# Patient Record
Sex: FEMALE | Race: BLACK OR AFRICAN AMERICAN | Employment: UNEMPLOYED | URBAN - METROPOLITAN AREA
[De-identification: names, ages, dates, MRNs, and addresses within clinical notes are randomized per-mention and may not be internally consistent; named-entity substitution may affect disease eponyms.]

---

## 2020-10-28 ENCOUNTER — VIRTUAL VISIT (OUTPATIENT)
Dept: SURGERY | Age: 35
End: 2020-10-28
Payer: MEDICAID

## 2020-10-28 VITALS — WEIGHT: 293 LBS | HEIGHT: 67 IN | BODY MASS INDEX: 45.99 KG/M2

## 2020-10-28 DIAGNOSIS — E66.01 MORBID OBESITY (HCC): ICD-10-CM

## 2020-10-28 DIAGNOSIS — M54.50 LOW BACK PAIN, UNSPECIFIED BACK PAIN LATERALITY, UNSPECIFIED CHRONICITY, UNSPECIFIED WHETHER SCIATICA PRESENT: ICD-10-CM

## 2020-10-28 DIAGNOSIS — N83.209 CYST OF OVARY, UNSPECIFIED LATERALITY: ICD-10-CM

## 2020-10-28 DIAGNOSIS — N85.8 UTERINE MASS: ICD-10-CM

## 2020-10-28 DIAGNOSIS — F17.200 SMOKER: ICD-10-CM

## 2020-10-28 DIAGNOSIS — G47.30 SLEEP DISORDER BREATHING: ICD-10-CM

## 2020-10-28 DIAGNOSIS — E66.01 MORBID OBESITY WITH BMI OF 60.0-69.9, ADULT (HCC): ICD-10-CM

## 2020-10-28 DIAGNOSIS — K80.20 CALCULUS OF GALLBLADDER WITHOUT CHOLECYSTITIS WITHOUT OBSTRUCTION: ICD-10-CM

## 2020-10-28 DIAGNOSIS — K80.20 CALCULUS OF GALLBLADDER WITHOUT CHOLECYSTITIS WITHOUT OBSTRUCTION: Primary | ICD-10-CM

## 2020-10-28 PROCEDURE — 99205 OFFICE O/P NEW HI 60 MIN: CPT | Performed by: SPECIALIST

## 2020-10-28 RX ORDER — ASPIRIN 325 MG
TABLET, DELAYED RELEASE (ENTERIC COATED) ORAL
COMMUNITY

## 2020-10-28 RX ORDER — AMLODIPINE BESYLATE 5 MG/1
10 TABLET ORAL DAILY
COMMUNITY

## 2020-10-28 NOTE — PROGRESS NOTES
Bariatric Surgery Consultation    Subjective: The patient is a 29 y.o. obese female with a Body mass index is 67.35 kg/m². .  The patient is at her heaviest weight for the past 14 years. she has been overweight since age 10.   she has been considering surgery since last year. she desires surgery at this time because of multiple health concerns and their lifestyle issues which are hindered by their weight. she has been referred by his family physician Dr Devin Boyd for evaluation and treatment of their obesity via surgical intervention. Ulysses Viramontes has tried multiple diets in her lifetime most recently tried physician supervised, behavior modification, unsupervised diets and Atkins    Bariatric comorbidities present are   Patient Active Problem List   Diagnosis Code    Morbid obesity with body mass index (BMI) greater than or equal to 79 in adult ([facility] [address] E66.01, Z68.45    Smoker F17.200    Cholelithiasis K80.20    Ovarian cyst N83.209    Uterine mass N85.8    Back pain M54.9    Sleep disorder breathing G47.30    Morbid obesity ([facility] [address] E66.01    Morbid obesity with BMI of 60.0-69.9, adult ([facility] [address] E66.01, Z68.44       The patient is considering laparoscopic gastric bypass surgery and laparoscopic sleeve gastrectomy for surgical weight loss due to their ineffective progress with medical forms of weight loss and the urging of their physician who cares for their primary medical issues. The patient  now presents  for consideration for weight loss surgery understanding the benefits of this over a medical approach of weight loss as was discussed in our presentation on weight loss surgery. They have discussed their plans both with their family and primary care physician who is in support of their pursuit of such. The patient has not had health issues as of late and denies and gastrointestinal disturbances other than what is outlined below in their review of symptoms.  All of their prior evaluations available by both their PCP's and specialists physicians have been reviewed today either in the Care Everywhere portal or scanned under the media tab. I have spent a large portion of my initial consultation today reviewing the patients current dietary habits which have contributed to their health issues and obesity. I have suggested to them personally a dietary regimen that they can initiate now to help with their status as it pertains to their weight. They understand that the most important aspect of their journey through their weight loss endeavor will be their adherence to a new lifestyle of healthy eating behavior. They also understand that an adherence to an exercise program will not only help with weight loss but is ultimately important in weight maintenance. The patients goal weight is 180lb. These goals are consistent with expected outcomes of their desired operation with a 2 stage approach potentially. her Medical goals are resolution of these health issues. 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       cholecalciferol (VITAMIN D3) (50,000 UNITS /1250 MCG) capsule Take by mouth every seven (7) days. No Known Allergies       Review of Systems:       General - No history or complaints of unexpected fever, chills, or weight loss  Head/Neck - No history or complaints of headache, diplopia, dysphagia, hearing loss  Cardiac - No history or complaints of chest pain, palpitations, murmur, or shortness of breath  Pulmonary - No history or complaints of shortness of breath, productive cough, hemoptysis  Gastrointestinal - minimal reflux,no  abdominal pain, obstipation/constipation or blood per rectum  Genitourinary - No history or complaints of hematuria/dysuria, stress urinary incontinence symptoms, or renal lithiasis  Musculoskeletal - mild joint pain ,  no muscular weakness  Hematologic - No history or complaints of bleeding disorders,  No blood transfusions  Neurologic - No history or complaints of  migraine headaches, seizure activity, syncopal episodes, TIA or stroke  Integumentary - No history or complaints of rashes, abnormal nevi, skin cancer  Gynecological - heavy menses               Objective:     Visit [facility]   5' 7\" (1.702 m)   Wt (!) 195 kg (430 lb)   BMI 67.35 kg/m²       Physical Examination:       Physical Examination: General appearance - alert, well appearing, and in no distress and oriented to person, place, and time  Mental status - alert, oriented to person, place, and time, normal mood, behavior, speech, dress, motor activity, and thought processes  Eyes - pupils equal and reactive, extraocular eye movements intact, sclera anicteric, left eye normal, right eye normal  Ears - right ear normal, left ear normal  Neck- good extension and flexion, no obvious swelling  Chest - good air movement  Heart - N/A  Abdomen - no obvious distension, scars as noted.   Neurological - alert, oriented, normal speech, no focal findings or movement disorder noted  Musculoskeletal - no swelling noted  Extremities - normal movement    Labs:       No results found for this or any previous visit (from the past 1440 hour(s)). Assessment:     Morbid obesity with comorbidity    Plan:     laparoscopic gastric bypass surgery and laparoscopic sleeve gastrectomy    This is a 29 y.o. female with a BMI of Body mass index is 67.35 kg/m². and the weight-related co-morbidties as noted below. Morenita meets the NIH criteria for bariatric surgery based upon the BMI of Body mass index is 67.35 kg/m². and multiple weight-related co-morbidties. Galo Luna has elected laparoscopic sleeve gastrectomy as her intervention of choice for treatment of morbid obestiy through surgical means secondary to its safety profile, rapid return to work  and decreases in operative risks over gastric bypass. In the office today, following Morenita's history and physical examination, a 30 minute discussion regarding the anatomic alterations for the laparoscopic sleeve gastrectomy was undertaken. The dietary expectations and the patient and physician dependent factors for success were thoroughly discussed, to include the need for interval follow-up and long-term dietary changes associated with success. The possible complications of the sleeve gastrectomy  were also discussed, to include;death, DVT/PE, staple line leak, bleeding, stricture formation, infection, nutritional deficiencies and sleeve dilation. Specific weight related outcomes for success were also discussed with an emphasis on careful and close follow-up with the first year and eating behavior modification as the baseline and cyclical hunger return. The patient expressed an understanding of the above factors, and her questions were answered in their entirety. In addition, the patient watched a seminar regarding obesity, surgical weight loss including, adjustable gastric band, gastric bypass, and sleeve gastrectomy.   This discussion contrasted the different surgical techniques, mechanisms of actions and expected outcomes, and surgical and medical risks associated with each procedure. In office today we had a long question and answer session where each questions was answered until there were no additional questions. Today, the patient had all of her questions answered and desires to proceed with  bariatric surgery initially choosing sleeve gastrectomy as her surgical option. Secondary Diagnoses:     Dietary Intervention  - The patient is currently scheduled to see or has been followed by a bariatric nutritionist for an attempt at preoperative weight loss as has been dictated by their insurance carrier. They will be assessed at various times during their follow up to evaluate their progress depending on the length of time that is required once again by their carrier. I have explained the importance of   preoperative weight loss and the benefits regarding lower surgical risk and also assisting the patient in reaching their weight loss goal. They understand also that they should participate in an  exercise program to assist in this weight loss. Finally they understand there is a physiologic benefit from the standpoint of hepatic volume reduction and reduction of central visceral adiposity   preoperatively. I have reiterated the importance of a low carbohydrate and high protein regimen to achieve their   stated goal. I have reviewed their current eating behavior prior to this encounter and explained to them in an exhaustive fashion the appropriate diet that they should adhere to. They have been   encouraged to loose weight pre operatively and understand it is our prerogative to cancel surgery or postpone their procedure in the event of significant weight gain. The patients weight loss goal pre operatively is 20-50 pounds. Smoking Cessation - Today I have counseled the patient extensively regarding smoking cessation for greater than 10 minutes.   They have been counseled extensively about the detrimental effects of smoking on their weight loss surgical procedure particularly for the gastric bypass and sleeve gastrectomy procedures. They understand that smoking leads to pulmonary issues postoperatively and can lead to gastric ulcers and marginal ulcers in the post bariatric surgery pouch that has been created. They understand that they must stop smoking 1 month at least prior to surgery or it may affect their ultimate progression to their procedure. They understand finally that labs may be obtained to prove that they have ceased smoking prior to surgery. Total time counseling was greater than 10 minutes. Obstructive Sleep Apnea -The patient understands the association of sleep apnea and obesity and the additional risk that it caries related to post surgical complications. If they have not been tested for sleep apnea and I feel they are at increased risk for this diagnosis, then they will be scheduled for a consultation with a Pulmonologist for such. In the event that they rhoda this diagnosis we will have the patient bring their CPAP machine to the hospital for use both postoperatively in the PACU and on the floor at its appropriate setting.  We will have them continue using it while at home after surgery and follow up with their pulmonologist 6 months after to be retested to see if it can be discontinued at that time period. This visit with Shannon Chavez was performed under virtual telemedicine guidelines during the coronavirus (CBUGS-02) public health emergency on 10/28/2020 in an interactive   fashion using [employer]. They understand that this telemedicine encounter is a billable service, with coverage determined by their insurance carrier. They are aware that   they may receive a bill and have provided verbal consent for this virtual visit. This visit was performed with the patient in their home environment and provider was   present at [facility].  I have spent over 60 minutes on this visit  both prior to the visits reviewing the patients chart and with the patient face to face. I have reviewed their medical history, performed a telemedicine physical examination, and discussed the plan of action to date. They understand that they will be asked   to come to the office when our office is allowed normal patient interaction, as dictated by public health officials, for a face-to-face visit to rediscuss all of the things we  have talked about today. During this visit we discussed the varieties of surgeries that we perform, how they would impact the patient from a weight loss standpoint   considering their medical issues and prior surgeries, and also the restrictions that the patient would have long-term with the operation that they have chosen.     Boo Nath M.D.  10/28/2020        Signed By: Cherie Miller MD     October 28, 2020

## 2020-12-27 DIAGNOSIS — G47.30 SLEEP DISORDER BREATHING: ICD-10-CM

## 2020-12-27 DIAGNOSIS — E66.01 MORBID OBESITY (HCC): ICD-10-CM

## 2020-12-27 DIAGNOSIS — N85.8 UTERINE MASS: ICD-10-CM

## 2020-12-27 DIAGNOSIS — M54.50 LOW BACK PAIN, UNSPECIFIED BACK PAIN LATERALITY, UNSPECIFIED CHRONICITY, UNSPECIFIED WHETHER SCIATICA PRESENT: ICD-10-CM

## 2020-12-27 DIAGNOSIS — K80.20 CALCULUS OF GALLBLADDER WITHOUT CHOLECYSTITIS WITHOUT OBSTRUCTION: ICD-10-CM

## 2020-12-27 DIAGNOSIS — N83.209 CYST OF OVARY, UNSPECIFIED LATERALITY: ICD-10-CM

## 2020-12-27 DIAGNOSIS — F17.200 SMOKER: ICD-10-CM

## 2020-12-27 DIAGNOSIS — E66.01 MORBID OBESITY WITH BMI OF 60.0-69.9, ADULT (HCC): ICD-10-CM

## 2021-01-27 ENCOUNTER — APPOINTMENT (OUTPATIENT)
Dept: GENERAL RADIOLOGY | Age: 36
End: 2021-01-27
Attending: SPECIALIST
Payer: MEDICAID

## 2021-01-27 ENCOUNTER — HOSPITAL ENCOUNTER (OUTPATIENT)
Age: 36
Setting detail: OUTPATIENT SURGERY
Discharge: HOME | End: 2021-01-27
Attending: SPECIALIST | Admitting: SPECIALIST
Payer: MEDICAID

## 2021-01-27 VITALS
BODY MASS INDEX: 67.43 KG/M2 | DIASTOLIC BLOOD PRESSURE: 84 MMHG | OXYGEN SATURATION: 100 % | HEART RATE: 96 BPM | SYSTOLIC BLOOD PRESSURE: 147 MMHG | RESPIRATION RATE: 20 BRPM | WEIGHT: 293 LBS | TEMPERATURE: 98 F

## 2021-01-27 DIAGNOSIS — K21.9 GASTROESOPHAGEAL REFLUX DISEASE, UNSPECIFIED WHETHER ESOPHAGITIS PRESENT: ICD-10-CM

## 2021-01-27 DIAGNOSIS — E66.01 MORBID OBESITY (HCC): ICD-10-CM

## 2021-01-27 PROCEDURE — 76040000019: Performed by: SPECIALIST

## 2021-01-27 PROCEDURE — 74240 X-RAY XM UPR GI TRC 1CNTRST: CPT | Performed by: SPECIALIST

## 2021-01-27 PROCEDURE — 74011000255 HC RX REV CODE- 255: Performed by: SPECIALIST

## 2021-01-27 PROCEDURE — 74240 X-RAY XM UPR GI TRC 1CNTRST: CPT

## 2021-01-27 NOTE — PROCEDURES
Patient:Morenita Li   : 1985  Medical Record Number:[id number]            PREPROCEDURE DIAGNOSIS: This patient is preoperative for laparoscopic gastric bypass surgeryprocedure with a history of  reflux disease. POSTPROCEDURE DIAGNOSIS: This patient is preoperative for laparoscopic gastric bypass surgeryprocedure with a history of  reflux disease. PROCEDURES PERFORMED: Upper GI study with barium. ESTIMATED BLOOD LOSS: None. SPECIMENS: None. STATEMENT OF MEDICAL NECESSITY: The patient is a patient with a  longstanding history of obesity. They are now considering the laparoscopic gastric bypass surgeryprocedure as a means of surgical weight control and due to their history of reflux disease and are being assessed preoperatively for such. DESCRIPTION OF PROCEDURE: The patient was brought to the fluoroscopy unit and  was given thin barium. On swallowing of barium, they were noted to have  normal peristalsis of their esophagus. They had prompt filling of distal  esophagus with tapering into the gastroesophageal junction. There was no evidence of a hiatal hernia present. Contrast then filled the gastric cardia, fundus,body and pre pyloric region with no abnormalities noted. Contrast then exited the pylorus in normal fashion. No obstruction was noted. There was no evidence of reflux noted.     (normal anatomy)    Byron Robledo MD

## 2021-02-01 ENCOUNTER — OFFICE VISIT (OUTPATIENT)
Dept: SURGERY | Age: 36
End: 2021-02-01

## 2021-02-01 VITALS — HEIGHT: 67 IN | BODY MASS INDEX: 45.99 KG/M2 | WEIGHT: 293 LBS

## 2021-02-01 DIAGNOSIS — E66.01 MORBID OBESITY (HCC): Primary | ICD-10-CM

## 2021-03-01 VITALS — BODY MASS INDEX: 45.99 KG/M2 | HEIGHT: 67 IN | WEIGHT: 293 LBS

## 2021-03-01 NOTE — PROGRESS NOTES
Medical Weight Loss Multi-Disciplinary Program    Name: Hernan Be   : 1985    Session# 2  Date: 3/1/2021    Visit Vitals  Ht 5' 7\" (1.702 m)   Wt (!) 195 kg (430 lb)   BMI 67.35 kg/m²     *Pt's weight is self-reported due to tele-health nutrition visits during [address] pandemic. Dietary Instructions    Reviewed intake  Understanding low carbohydrates, low sugar, higher protein meals  Instruction given for personal dietary changes  Discussed perceived compliance  Comments: RD Reviewed Diet History and Physical Activity/Exercise habits. Recommended dietary changes for both before and after surgery. Reviewed recommendation to follow 9926-3109 calorie diet, working to reduce total carbohydrate intake to  g or less per day and increasing protein intake to  g per day, compared current intake to recommendations. Recommend pt adopt an exercise routine of at least 3-5 days a week for at least 30 minutes/day. If pt unable to participate in walking, mindy, swimming, or other exercises, recommend pt work with a physical therapist and/or participate in chair exercises, yoga, and/or increase activities of daily living as able. Patient participated in pre-recorded education class video. Recorded video of dietitian discussing role of low carbohydrate diet for promoting weight loss before and after surgery. Reviewed sources of carbohydrates, label reading tips and reviewed exchange list for carbohydrates. Discussed ways to reduce carbohydrates and reviewed example day of high carbohydrate vs. Low carbohydrate diet. This class reviewed materials provided at patients initial appointment and provided in education packet to patient. Patient watched the video in its entirety and turned in the 3 embedded passcodes from the video session.       Behavior Modification    Identify obstacles to trigger change  Achieving/Rewarding goals met  Positive attitude  Comments: Reinforced importance continuing to modify lifestyle patterns and behaviors to promote weight loss and long term weight maintenance     Comments:  Pt instructed to start carbohydrate counting and label reading. Recommend pt start reducing and eliminating sugar-sweetened beverages, concentrated sweets (cakes/candy/cupcakes/poptarts/cereals/etc.), juice, and high-carbohydrate foods. Physical Activity/Exercise      Patient has been educated on the importance of starting a physical activity regimen, reinforced the importance of regular physical activity for total health and weight management. Suggested starting exercise regimen of cardiovascular and resistance training for at least 3-5 days per week for 30-60 minutes. Encouraged pt to set and keep weekly exercise goals. Goals:   1. Work to increase to 3-4 small meals per day, with planned snacks as needed. Recommend following plate method for meal planning - focusing on lean protein, non-starchy vegetables, and measured amounts of starch. - Goal of  g protein and  g carbohydrate per day. - Recommend continuing protein supplement as meal replacement at least 1x/day OR as high protein snack option  2. Increase non caloric fluid to 64 oz per day. Eliminate caffeine, added sugar, carbonation, and straws.               -Continue to work to decrease sugar sweetened beverages - goal of calorie free beverages only              -Must eliminate caffeine prior to surgery and avoid for ~6-8 weeks   -Practice 30:30 rule,  food and flood   3. Start activity regimen, work to increase ADL  4. Start Complete MVI    Candidate for surgery (per RD):  PENDING

## 2021-03-03 ENCOUNTER — CLINICAL SUPPORT (OUTPATIENT)
Dept: SURGERY | Age: 36
End: 2021-03-03

## 2021-03-03 DIAGNOSIS — E66.01 MORBID OBESITY (HCC): Primary | ICD-10-CM

## 2021-04-01 ENCOUNTER — CLINICAL SUPPORT (OUTPATIENT)
Dept: SURGERY | Age: 36
End: 2021-04-01

## 2021-04-01 VITALS — HEIGHT: 67 IN | WEIGHT: 293 LBS | BODY MASS INDEX: 45.99 KG/M2

## 2021-04-01 DIAGNOSIS — E66.01 MORBID OBESITY (HCC): Primary | ICD-10-CM

## 2021-04-01 NOTE — PROGRESS NOTES
Medical Weight Loss Multi-Disciplinary Program    Name: Desiree Lazo   : 1985    Session# 3  Date: 2021    Visit Vitals  Ht 5' 7\" (1.702 m)   Wt (!) 195 kg (430 lb)   BMI 67.35 kg/m²       *Pt's weight is self-reported due to tele-health nutrition visits during [address] pandemic. Dietary Instructions    Reviewed intake  Understanding low carbohydrates, low sugar, higher protein meals  Instruction given for personal dietary changes  Discussed perceived compliance  Comments: Comments: RD Reviewed Diet History and Physical Activity/Exercise habits. Recommended dietary changes discussed for both before and after surgery. Reviewed recommendation to follow [id number] calorie diet, working to reduce total carbohydrate intake to  g or less per day and increasing protein intake to  g per day, compared current intake to recommendations. Recommend pt adopt an exercise routine of at least 3-5 days a week for at least 30 minutes/day. If pt unable to participate in walking, mindy, swimming, or other exercises, recommend pt work with a physical therapist and/or participate in chair exercises, yoga, and/or increase activities of daily living as able. Patient participated in pre-recorded education class video. Recorded video of dietitian discussing role of high protein diet for promoting weight loss before and after surgery. Reviewed sources of protein, label reading tips, and ways to measure proteins. Spent portion of education emphasizing the role of protein supplements in the post operative diet. Reviewed label reading for protein shakes and types of protein supplements. Patient watched the video in its entirety and turned in the 3 embedded passcodes from the video session.       Behavior Modification    Identify obstacles to trigger change  Achieving/Rewarding goals met  Positive attitude  Comments: Reinforced importance continuing to modify lifestyle patterns and behaviors to promote weight loss and long term weight maintenance. Comments:  Pt set goals to make sure they are meeting protein recommendations, switch to lean sources of protein, start sampling protein supplements, and read labels for protein content in foods. Pt response to questionnaire: (RD provided feedback, recommended changes, areas of improvement, and encouragement for positive changes being made):     - What changes have you made to your food, nutrition, or exercise this month? For the month of March i cut out chips and candy and only drunk soda twice a week. I drink water everday now.  - How many days a week are you eating sweets (candy, sugar in drinks, pastries, cake, ice cream, candy bars, milkshakes, etc.)? Everyday due to the sugar intake in my drinks   How many days a week are you drinking sugar-sweetened beverages (Soda, Ceferino-Aid, juice, Gingerale, Gatorade, Juice, Sweet Tea, Lemonade, etc.)? i drink sugar-sweetened beverages everyday   How many ounces of fluid (zero-calorie and zero-sugar fluids) are you drinking per day? i drink a cup or a full glass of water every day   Have you sampled protein supplements yet? If so  please list. no i have not tried protein supplements yet   How many times per week are you eating fast-food?i eat out 2-3 times a week   How many meals are you eating per day? i eat 2 meals per day   How many meals are you eating per day? my goals for April is to completely cut out sodas, eat 3 meals per day, cut fast food down to only twice a week, and start walking as an exercise.  Please list your weekly/monthly exercise habits list type of exercise, duration, and how many days per week. i dont currently have a exercise pattern. I will start walking the track in April. 24-hour recall:  breakfast i ate 1 breakfast hotpocket (egg, french, sausage, and cheese and my drink was sweet tea. ...    Lunch: I didnt eat lunch  Dinner i had a fish filet combo from [employer] with a sweet tea ( i didnt eat the fries). Fluids: Through out the day i had one cup of ice water and sweet tea. O, i had a Snapple Apple drink as well. Physical Activity/Exercise    Discussed Perceived Compliance  Motivation    Patient has been educated on the importance of starting and maintaining a physical activity regimen, reinforced the importance of regular physical activity for total health and weight management. Suggested starting or continuing exercise regimen of cardiovascular and resistance training for at least 3-5 days per week for 30-60 minutes. Recommend pt track progress weekly. Goals:   1. Work to increase to 3-4 small meals per day, with planned snacks as needed. Recommend following plate method for meal planning - focusing on lean protein, non-starchy vegetables, and measured amounts of starch. - Goal of  g protein and  g carbohydrate per day. - Recommend continuing protein supplement as meal replacement at least 1x/day OR as high protein snack option  2. Increase non caloric fluid to 64 oz per day. Eliminate caffeine, added sugar, carbonation, and straws.               -Continue to work to decrease sugar sweetened beverages - goal of calorie free beverages only              -Must eliminate caffeine prior to surgery and avoid for ~6-8 weeks   -Practice 30:30 rule,  food and flood   3. Start activity regimen, work to increase ADL  4. Start Complete MVI    Candidate for surgery (per RD):  PENDING